# Patient Record
Sex: FEMALE | Race: WHITE | NOT HISPANIC OR LATINO | Employment: FULL TIME | URBAN - METROPOLITAN AREA
[De-identification: names, ages, dates, MRNs, and addresses within clinical notes are randomized per-mention and may not be internally consistent; named-entity substitution may affect disease eponyms.]

---

## 2020-07-30 ENCOUNTER — TELEPHONE (OUTPATIENT)
Dept: NEUROLOGY | Facility: HOSPITAL | Age: 25
End: 2020-07-30

## 2020-07-30 NOTE — TELEPHONE ENCOUNTER
----- Message from Michelle Medellin sent at 7/30/2020 11:50 AM CDT -----  Regarding: NEW  New patient - Who called: Referral faxed from Endocrinology [facility]    Referral source- MD/patient/Internet/other: [place]/Dr. 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 Chapman    Patient call back #: [phone]    PMH: Anxiety, Depression, Asthma, Hypoglycemia, GERD    Medication:    Allergies: Methylprednisolone, Amoxicillin, Penicillin    Treatments:  Octreotide 100 mcg TID     Records:  EGD & Colonoscopy report requested from 2020, and Recent Treating MD Clinic Note 7/21/2020

## 2020-07-30 NOTE — TELEPHONE ENCOUNTER
----- Message from Michelle Medellin sent at 7/30/2020 11:50 AM CDT -----  Regarding: NEW  New patient - Who called: Referral faxed from Endocrinology [facility]    Referral source- MD/patient/Internet/other: [place]/Dr. 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 Chapman    Patient call back #: [phone]    PMH: Anxiety, Depression, Asthma, Hypoglycemia, GERD    Medication:    Allergies: Methylprednisolone, Amoxicillin, Penicillin    Treatments:  Octreotide 100 mcg TID     Records:  EGD & Colonoscopy report requested from 2020, and Recent Treating MD Clinic Note 7/21/2020

## 2020-07-31 ENCOUNTER — TELEPHONE (OUTPATIENT)
Dept: NEUROLOGY | Facility: HOSPITAL | Age: 25
End: 2020-07-31

## 2020-07-31 DIAGNOSIS — E16.1 ADULT ONSET PERSISTENT HYPERINSULINEMIC HYPOGLYCEMIA WITHOUT INSULINOMA: Primary | ICD-10-CM

## 2020-07-31 NOTE — TELEPHONE ENCOUNTER
----- Message from Michelle Medellin sent at 7/30/2020 11:50 AM CDT -----  Regarding: NEW  New patient - Who called: Referral faxed from Endocrinology [facility]    Referral source- MD/patient/Internet/other: [place]/Dr. 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 Chapman    Patient call back #: [phone]    PMH: Anxiety, Depression, Asthma, Hypoglycemia, GERD    Medication:    Allergies: Methylprednisolone, Amoxicillin, Penicillin    Treatments:  Octreotide 100 mcg TID     Records:  EGD & Colonoscopy report requested from 2020, and Recent Treating MD Clinic Note 7/21/2020

## 2020-08-03 ENCOUNTER — TELEPHONE (OUTPATIENT)
Dept: NEUROLOGY | Facility: HOSPITAL | Age: 25
End: 2020-08-03

## 2020-08-03 NOTE — TELEPHONE ENCOUNTER
----- Message from Michelle Medellin sent at 7/30/2020 11:50 AM CDT -----  Regarding: NEW  New patient - Who called: Referral faxed from Endocrinology [facility]    Referral source- MD/patient/Internet/other: [place]/Dr. 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 Chapman    Patient call back #: [phone]    PMH: Anxiety, Depression, Asthma, Hypoglycemia, GERD    Medication:    Allergies: Methylprednisolone, Amoxicillin, Penicillin    Treatments:  Octreotide 100 mcg TID     Records:  EGD & Colonoscopy report requested from 2020, and Recent Treating MD Clinic Note 7/21/2020

## 2020-08-06 ENCOUNTER — DOCUMENTATION ONLY (OUTPATIENT)
Dept: NEUROLOGY | Facility: HOSPITAL | Age: 25
End: 2020-08-06

## 2020-08-10 ENCOUNTER — HOSPITAL ENCOUNTER (OUTPATIENT)
Dept: RADIOLOGY | Facility: HOSPITAL | Age: 25
Discharge: HOME | End: 2020-08-10
Attending: SURGERY
Payer: COMMERCIAL

## 2020-08-10 DIAGNOSIS — E16.1 ADULT ONSET PERSISTENT HYPERINSULINEMIC HYPOGLYCEMIA WITHOUT INSULINOMA: ICD-10-CM

## 2020-08-10 PROCEDURE — 78815 PET IMAGE W/CT SKULL-THIGH: CPT | Mod: 26,PI,, | Performed by: RADIOLOGY

## 2020-08-10 PROCEDURE — 78815 PET IMAGE W/CT SKULL-THIGH: CPT | Mod: TC

## 2020-08-10 PROCEDURE — 78815 NM PET 68GA DOTATATE WHOLE BODY: ICD-10-PCS | Mod: 26,PI,, | Performed by: RADIOLOGY

## 2020-08-10 PROCEDURE — A9587 GALLIUM GA-68: HCPCS

## 2020-11-27 ENCOUNTER — OFFICE VISIT (OUTPATIENT)
Dept: ENDOCRINOLOGY | Facility: CLINIC | Age: 25
End: 2020-11-27
Payer: COMMERCIAL

## 2020-11-27 VITALS
DIASTOLIC BLOOD PRESSURE: 68 MMHG | HEART RATE: 70 BPM | OXYGEN SATURATION: 98 % | WEIGHT: 185.63 LBS | RESPIRATION RATE: 16 BRPM | SYSTOLIC BLOOD PRESSURE: 116 MMHG

## 2020-11-27 DIAGNOSIS — E16.2 HYPOGLYCEMIA: Primary | ICD-10-CM

## 2020-11-27 DIAGNOSIS — F32.A DEPRESSION, UNSPECIFIED DEPRESSION TYPE: ICD-10-CM

## 2020-11-27 PROCEDURE — 99204 PR OFFICE/OUTPT VISIT, NEW, LEVL IV, 45-59 MIN: ICD-10-PCS | Mod: S$GLB,,, | Performed by: STUDENT IN AN ORGANIZED HEALTH CARE EDUCATION/TRAINING PROGRAM

## 2020-11-27 PROCEDURE — 99999 PR PBB SHADOW E&M-EST. PATIENT-LVL III: CPT | Mod: PBBFAC,,, | Performed by: STUDENT IN AN ORGANIZED HEALTH CARE EDUCATION/TRAINING PROGRAM

## 2020-11-27 PROCEDURE — 99204 OFFICE O/P NEW MOD 45 MIN: CPT | Mod: S$GLB,,, | Performed by: STUDENT IN AN ORGANIZED HEALTH CARE EDUCATION/TRAINING PROGRAM

## 2020-11-27 PROCEDURE — 99999 PR PBB SHADOW E&M-EST. PATIENT-LVL III: ICD-10-PCS | Mod: PBBFAC,,, | Performed by: STUDENT IN AN ORGANIZED HEALTH CARE EDUCATION/TRAINING PROGRAM

## 2020-11-27 RX ORDER — FLASH GLUCOSE SCANNING READER
1 EACH MISCELLANEOUS DAILY
Qty: 1 EACH | Refills: 0 | Status: SHIPPED | OUTPATIENT
Start: 2020-11-27 | End: 2020-12-16 | Stop reason: SDUPTHER

## 2020-11-27 RX ORDER — BLOOD-GLUCOSE SENSOR
EACH MISCELLANEOUS
Qty: 3 EACH | Refills: 11 | Status: CANCELLED | OUTPATIENT
Start: 2020-11-27

## 2020-11-27 RX ORDER — FLASH GLUCOSE SENSOR
2 KIT MISCELLANEOUS
Qty: 2 KIT | Refills: 11 | Status: SHIPPED | OUTPATIENT
Start: 2020-11-27 | End: 2020-11-30

## 2020-11-27 RX ORDER — PREDNISONE 10 MG/1
TABLET ORAL
COMMUNITY
Start: 2020-10-08

## 2020-11-27 RX ORDER — BUPROPION HYDROCHLORIDE 150 MG/1
TABLET, EXTENDED RELEASE ORAL
COMMUNITY

## 2020-11-27 RX ORDER — BUPROPION HYDROCHLORIDE 150 MG/1
TABLET ORAL
COMMUNITY

## 2020-11-27 RX ORDER — OMEPRAZOLE MAGNESIUM 10 MG/1
GRANULE, DELAYED RELEASE ORAL
COMMUNITY

## 2020-11-27 RX ORDER — BLOOD-GLUCOSE TRANSMITTER
EACH MISCELLANEOUS
Qty: 1 DEVICE | Refills: 4 | Status: CANCELLED | OUTPATIENT
Start: 2020-11-27

## 2020-11-27 RX ORDER — FLASH GLUCOSE SENSOR
KIT MISCELLANEOUS
COMMUNITY
End: 2020-11-30

## 2020-11-27 NOTE — PATIENT INSTRUCTIONS
"Please perform fingerstick with glucometer if your freestyle mic reads <55 and record to be evaluated at next visit    If both sources showing glucose <55, please obtain lab work at that time and have it drawn immediately to acquire serum blood glucose and necessary labs at preferred laboratory    Please obtain labs prior to next virtual visit.          Hypoglycemia Treatment - The "15-15 Rule".    If you experience an episode of hypoglycemia (glucose less than 70), follow the 15-15 rule.    Have 15 grams of carbohydrate to raise your blood sugar and recheck it after 15 minutes. If its still below 70 mg/dL, have another serving.    Repeat these steps until your blood sugar is at least 70 mg/dL. Once your blood sugar is back to normal, eat a meal or snack to make sure it doesnt lower again.    This may be:    4 ounces (1/2 cup) of juice or regular soda (not diet)  1 tablespoon of sugar, honey, or corn syrup  Hard candies, jellybeans, or gumdrops--see food label for how many to consume  Make a note about any episodes of low blood sugar and talk with your health care team about why it happened. They can suggest ways to avoid low blood sugar in the future.     Many people tend to want to eat as much as they can until they feel better. This can cause blood sugar levels to shoot way up. Using the step-wise approach of the "15-15 Rule" can help you avoid this, preventing high blood sugar levels.    Note:  When treating a low, the choice of carbohydrate source is important. Complex carbohydrates, or foods that contain fats along with carbs (like chocolate) can slow the absorption of glucose and should not be used to treat an emergency low.    For more information, visit:  https://www.diabetes.org/diabetes/medication-management/blood-glucose-testing-and-control/hypoglycemia        "

## 2020-11-27 NOTE — PROGRESS NOTES
Subjective:      Patient ID: Gemma Granados is a 25 y.o. female.    Chief Complaint:  Hypoglycemia      History of Present Illness  Ms. Granados is a 26 yo female past medical history for anxiety, depression, asthma, recurrent hypoglycemia, GERD presents for initial evaluation for recurrent hypoglycemia and neuroendocrine tumor evaluation.    Initial Endocrine evaluation:  11/20/2019    Patient recently evaluated by Medical Oncology for suspected neuroendocrine tumor.    She was experiencing episodes of hypoglycemia immediately after meals for several years beginning 2018 however recalls dating back to childhood.    The episodes were apparently sporadic without any association with menstrual cycle.  She denies consistent recurrence or identifiable association with diet.    She endorses at these hypoglycemic episodes occur duration 1-2 weeks.   Variable recurrence occur every 2-3 weeks and has been without an episode for 1 month max.    Symptoms associated with diaphoresis, shaking, tremors, nausea, headache with FS glucose in 70s with glucose levels dropping to 55 postprandially  Symptoms most often precipitated postprandially  Denies any bowel or weight change, numbness tingling, low blood pressure, or chronic/recent steroid use    Patient has been seen by multiple providers with extensive workups.    She is noted to have positive serum Chromogranin A, and negative initial lab screening for pheochromocytoma, serum5-HIAA, serotonin, vasoactive intestinal peptide.    Workup thus far:      7/13/20     Chromogranin A (105)    Serotonin (58)    Na (138), K (4), Glu (89)    VIP (unavailable)       03/04/20    5-HIAA (9.1)    Chromogranin A (142)    Fasting Insulin (7.2)    C-peptide (1.1)    Pancreatic polypeptide (<12.6)    Gastrin (47)    Glucagon (unavailable)    TSH (2.89)    FT4 (1.05)      IGF-1 (11/21/19) (235)   S. Norepi (729)   S. Epi (26)   Dopa (<30)   U. Norepi (124)   U. Metaneph (34)   DHEAS  (395)   Cortisol 4/9/20 07:29 (8.6)      Fasting labs (11/2019): glucose (57), insulin (7.5), proinsulin (6), C-peptide (2.2), A1C (4.8), BHB (1)   NO available sulfonurea screen or insulin ab     Radiographic evaluation:   CT abdomen/Pelvis pancreatic protocol (03/17/20) - right adnexal septated mass 4.3 x 3.1 cm presumed ovarian cyst   EGD (2/6/20) - nondiagnostic   Colonoscopy (4/30/20) with elevated Chromogranin A - nondiagnostic   Octreoscan (4/17/20) - nondiagnostic - not taking octreotide at the time   Pet Gallium 68 net spot (8/10/20) - nondiagnostic for somatostatin receptor disease    CGM with FS mic: 5/3 - 5/18/2020 = Random hypoglycemia in 40s fasting and during day     Current medications:    Freestyle mic (check 6-8 times per day)    Prednisone 5 mg daily PRN for recurrent hives. Takes infrequent takes less than twice per year - improves her blood glucose when taking significantly, no reported lows.    Medications attempted:   Acarbose 25 mg TID - no improvement   Diazoxide BID - not tolerated with GI s/e   Octreotide 100 mcg soln t.i.d. 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  Denies tobacco, alcohol, drug use    Regarding Recurrent hypoglycemia,   Symptoms first started since childhood became worse in late 2018 began occurring more frequently 1-2 weeks duration BG will be low despite diet modification. Previously hypoglycemia aware, however no longer able to feel lows. BG will slowly return to normal after 20-30 min. Will respond to juice  Does not have symptoms unless very low <40.   Avoids alcohol as it worsens  Certain food especially carbohydrates occur.  Checks glucose levels at least 5-6 times per day due to concerns of hypoglycemia unawareness and overnight  lows.    When the episodes do not occur, she believes diet does not affect BG.    Episode lasted: 30 minutes  Episode occur  Nearly every time she eats (1-2 week duration)  Episode within 30 minutes to 1 hour of consuming meal  Treatment included:  Usually waits and returns to normal unless declines, then will try juice  Glucose checked <55 during episode (average 40s) - lowest 27 (w/o syncope/LOC)    Episodes occur overnight: infrequent (stopped eating prior to bedtime usually worse and associated with bedtime snack)  Episodes occur after prolonged fasting: unchanged  Episodes occur with physical exertion: OFTEN  Consuming a carb containing snack usually relieves his symptoms slowly but does improve (1 hour)    Context of events:  Fasting or postprandial  Meals that precipitate symptoms: unable to identify any specific food groups    Symptoms during episode:     No   Yes  []    [x]   Shakiness  []    [x]   Confusion  - when very low  []    [x]   Weakness  []    [x]   Diaphoresis  [x]    []   Visual change  [x]    []   Tunnel vision  []    [x]   Mental Fog  [x]    []   Syncope  [x]    []   Weight loss  []    [x]   Palpitations  []    [x]   Tremor  []    [x]   Anxiety/Nervousness - panic attack  [x]    []   Polyuria  [x]    []   Polydipsia  [x]    []   Nocturia  [x]    []   New Medications    [x]    []   Alcohol use  [x]    []   History of Diabetes  [x]    []   Recent use of insulin or insulin secretagogues  [x]    []   History of GI surgery, bypass  [x]    []   History of malabsorption, celiacs disease  [x]    []   History of steroid use  [x]    []   History of adrenal insufficiency  [x]    []   History of thyroid disorder  [x]    []   No personal or family history of MEN-1    Experiences  Sympathetic Symptoms  - Sweating, Palpitation, Tremor, Anxiety  Neuroglycopenia  - cognitive impairment/confusion, behavior changes, weakness, paresthesias, poor motor coordination        Review of Systems   Constitutional:  Negative for activity change, appetite change, chills, diaphoresis, fatigue, fever and unexpected weight change.   HENT: Negative for sore throat, trouble swallowing and voice change.    Eyes: Negative for visual disturbance.   Respiratory: Negative for shortness of breath.    Cardiovascular: Negative for chest pain, palpitations and leg swelling.   Gastrointestinal: Negative for abdominal pain, constipation, diarrhea, nausea and vomiting.   Endocrine: Negative for cold intolerance, heat intolerance, polydipsia, polyphagia and polyuria.   Genitourinary: Negative for urgency.   Musculoskeletal: Negative for arthralgias.   Skin: Negative for wound.   Neurological: Negative for tremors, weakness and headaches.   Psychiatric/Behavioral: Negative for confusion and sleep disturbance. The patient is not nervous/anxious.        Social and family history reviewed  Current medications and allergies reviewed    Objective:   /68 (BP Location: Right arm, Patient Position: Sitting)   Pulse 70   Resp 16   Wt 84.2 kg (185 lb 10 oz)   SpO2 98%   Physical Exam  Vitals signs and nursing note reviewed.   Constitutional:       General: She is not in acute distress.     Appearance: She is well-developed.   HENT:      Head: Normocephalic and atraumatic.      Right Ear: External ear normal.      Left Ear: External ear normal.      Nose: Nose normal.   Eyes:      General: No scleral icterus.     Conjunctiva/sclera: Conjunctivae normal.   Neck:      Musculoskeletal: Normal range of motion and neck supple. No neck rigidity.      Thyroid: No thyromegaly.      Trachea: No tracheal deviation.      Comments: No thyroid enlargement noted on exam  Cardiovascular:      Rate and Rhythm: Normal rate.      Heart sounds: Normal heart sounds. No murmur.   Pulmonary:      Effort: Pulmonary effort is normal.      Breath sounds: Normal breath sounds.   Abdominal:      Palpations: Abdomen is soft. There is no mass.      Tenderness: There is no  abdominal tenderness.   Musculoskeletal: Normal range of motion.   Lymphadenopathy:      Cervical: No cervical adenopathy.   Skin:     General: Skin is warm.      Findings: No rash.   Neurological:      Mental Status: She is alert and oriented to person, place, and time.      Sensory: No sensory deficit.      Coordination: Coordination normal.   Psychiatric:         Judgment: Judgment normal.       BP Readings from Last 1 Encounters:   11/27/20 116/68      Wt Readings from Last 1 Encounters:   11/27/20 1112 84.2 kg (185 lb 10 oz)     There is no height or weight on file to calculate BMI.    Lab Review:   No results found for: HGBA1C  No results found for: CHOL, HDL, LDLCALC, TRIG, CHOLHDL  No results found for: NA, K, CL, CO2, GLU, BUN, CREATININE, CALCIUM, PROT, ALBUMIN, BILITOT, ALKPHOS, AST, ALT, ANIONGAP, ESTGFRAFRICA, EGFRNONAA, TSH    All pertinent labs reviewed    Assessment and Plan     Hypoglycemia  Unclear etiology - history suggestive of postprandial hypoglycemia    -  Has been seen by multiple providers with extensive workups.      -  From a laboratory standpoint she has a positive serum chromogranin a, but negative laboratory evaluation for pheochromocytoma, serum serotonin, adrenal function, normal IGF-1, thyroid function    -  Additionally, radiographic/procedural evaluation including a EGD/colonoscopy, endoscopic US (Dr. Alex Still in [place]), CT abdomen/pelvis, Octreoscan, and most recently NM PET gallium 68 dotatate have all been nondiagnostic.    -  Worse fluctuations with octreotide  -  No improvement with Acarbose  -  Worse s/e with diazoxide  Available lab workup although not optimal is suggestive of endogenous insulin production vs sulfonyl urea abuse vs insulin ab induced.   (per care everywhere):   Fasting labs (11/2019):    Glucose (57)   Insulin (7.5)   Proinsulin (6)   C-peptide (2.2)   A1C (4.8)   BHB (1)   No available sulfonurea screen or insulin ab    -  Suspect serum  "chromogranin A was false positive.  -  Current imaging available outlined above non-diagnostic for neuroendocrine tumor   -  Family history and without known MEN or neuroendocrine tumor  -  Denies sulfonurea or insulin use  -  Patient lives 2-3 hours away and prefers virtual visits.  -  Hypoglycemia unaware, pt concerned of o/n lows    PLAN  Instructed patient to obtain FS with glucometer recordings and freestyle mic to bring documentation of lows at next virtual visit in 1-2 months    Rx for FS mic 2 today provided, although pt prefers dexcom    Instructed to obtain labs (external labs) while at work within 30 minutes of low blood glucose noted <55 on at least two separate sources of recorded low blood glucose as we are unable to perform observed fasting or mixed meal testing at our facility at this time     Sulfonurea screen   Insulin   Proinsulin    CMP   BHB   C-peptide   Insulin antibody    -  Advised patient if glucose is low, she should repeat fingerstick in different area of her body  -  Continue protein heavy meal  -  Avoid simple carbohydrates, processed meats  -  Provided patient with Hypoglycemia Treatment - The "15-15 Rule"  -  Consider uncooked cornstarch 1.5-2.5g/kg Q4-6H (or 14 grams/2TbL Q4-6H) vs (0.3-0.55g/kg body weight)    -  Consider Professional CGM with dexcom  -  Can consider selective arterial calcium stimulation test with hepatic venous sampling to distinguish between a focal abnormality (insulinoma) and a diffuse process (islet-cell hypertrophy/nesidioblastosis)      Depression  On wellbutrin, Sertraline - managed by PCP      Follow up virtual visit in approximately  1 month once labs acquired     Joselito Ibanez DO  Endocrinology Fellow  [facility], [address]th Floor  [address] [place], [place], [address]    Office: [phone]  Fax: [phone]       Disclaimer: This note has been generated using voice-recognition software. There may be typographical " errors that have been missed during proof-reading.    The above history labs imaging impression and plan were discussed with attending physician who is in agreement and also took part in this patient's care.  I personally reviewed all of the patients available medications, labs, imaging, vitals, allergies, medical history.

## 2020-11-27 NOTE — PROGRESS NOTES
I have reviewed and concur with Dr. Ibanez's history, physical, assessment, and plan.  I have personally interviewed and examined the patient.  See below addendum for my evaluation and additional findings.    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  At the time of diagnosis she had a plasma blood glucose of 57 with elevated insulin, proinsulin, and C-peptide however no sulfonylurea or hypoglycemic agent screen was done at that time (sulfonylurea screen done several months later was negative however she was not hypoglycemic at that time).  She has had an extensive workup including multiple imaging studies that were normal.  She has been treated with short-acting octreotide which worsened her symptoms and acarbose which did not help.  As she is currently not on any medications but still reporting clustered episodes of hypoglycemia every couple of weeks, will have her repeat hypoglycemia labs.  We will switch to the freestyle mic 2 continuous glucose monitor as it has the benefit of alarms for low blood sugars.  She will have labs drawn when having hypoglycemia confirmed with fingerstick blood glucose less than 55.  Will follow up with a virtual visit in 1 month to discuss results and next steps.  If she continues to have hypoglycemia with elevated C-peptide and insulin we may need to proceed with selective arterial calcium stimulation test.    Jaspal Fraser MD

## 2020-11-27 NOTE — ASSESSMENT & PLAN NOTE
Unclear etiology - history suggestive of postprandial hypoglycemia    -  Has been seen by multiple providers with extensive workups.      -  From a laboratory standpoint she has a positive serum chromogranin a, but negative laboratory evaluation for pheochromocytoma, serum serotonin, adrenal function, normal IGF-1, thyroid function    -  Additionally, radiographic/procedural evaluation including a EGD/colonoscopy, endoscopic US (Dr. Alex Still in [place]), CT abdomen/pelvis, Octreoscan, and most recently NM PET gallium 68 dotatate have all been nondiagnostic.    -  Worse fluctuations with octreotide  -  No improvement with Acarbose  -  Worse s/e with diazoxide  Available lab workup although not optimal is suggestive of endogenous insulin production vs sulfonyl urea abuse vs insulin ab induced.   (per care everywhere):   Fasting labs (11/2019):    Glucose (57)   Insulin (7.5)   Proinsulin (6)   C-peptide (2.2)   A1C (4.8)   BHB (1)   No available sulfonurea screen or insulin ab    -  Suspect serum chromogranin A was false positive.  -  Current imaging available outlined above non-diagnostic for neuroendocrine tumor   -  Family history and without known MEN or neuroendocrine tumor  -  Denies sulfonurea or insulin use  -  Patient lives 2-3 hours away and prefers virtual visits.  -  Hypoglycemia unaware, pt concerned of o/n lows    PLAN  Instructed patient to obtain FS with glucometer recordings and freestyle mic to bring documentation of lows at next virtual visit in 1-2 months    Rx for FS mic 2 today provided, although pt prefers dexcom    Instructed to obtain labs (external labs) while at work within 30 minutes of low blood glucose noted <55 on at least two separate sources of recorded low blood glucose as we are unable to perform observed fasting or mixed meal testing at our facility at this time     Sulfonurea screen   Insulin   Proinsulin    CMP   BHB   C-peptide   Insulin antibody    -  Advised patient  "if glucose is low, she should repeat fingerstick in different area of her body  -  Continue protein heavy meal  -  Avoid simple carbohydrates, processed meats  -  Provided patient with Hypoglycemia Treatment - The "15-15 Rule"  -  Consider uncooked cornstarch 1.5-2.5g/kg Q4-6H (or 14 grams/2TbL Q4-6H) vs (0.3-0.55g/kg body weight)    -  Consider Professional CGM with dexcom  -  Can consider selective arterial calcium stimulation test with hepatic venous sampling to distinguish between a focal abnormality (insulinoma) and a diffuse process (islet-cell hypertrophy/nesidioblastosis)    "

## 2020-11-30 ENCOUNTER — PATIENT MESSAGE (OUTPATIENT)
Dept: ENDOCRINOLOGY | Facility: CLINIC | Age: 25
End: 2020-11-30

## 2020-11-30 RX ORDER — FLASH GLUCOSE SENSOR
2 KIT MISCELLANEOUS
Qty: 1 KIT | Refills: 11 | Status: SHIPPED | OUTPATIENT
Start: 2020-11-30 | End: 2020-12-16 | Stop reason: SDUPTHER

## 2020-12-15 DIAGNOSIS — E16.2 HYPOGLYCEMIA: ICD-10-CM

## 2020-12-16 DIAGNOSIS — E16.2 HYPOGLYCEMIA: ICD-10-CM

## 2020-12-16 RX ORDER — FLASH GLUCOSE SCANNING READER
1 EACH MISCELLANEOUS DAILY
Qty: 1 EACH | Refills: 0 | Status: SHIPPED | OUTPATIENT
Start: 2020-12-16

## 2020-12-16 RX ORDER — FLASH GLUCOSE SENSOR
2 KIT MISCELLANEOUS
Qty: 1 KIT | Refills: 11 | Status: SHIPPED | OUTPATIENT
Start: 2020-12-16

## 2020-12-16 RX ORDER — FLASH GLUCOSE SENSOR
2 KIT MISCELLANEOUS
Qty: 2 KIT | Refills: 11 | Status: SHIPPED | OUTPATIENT
Start: 2020-12-16

## 2021-01-08 ENCOUNTER — OFFICE VISIT (OUTPATIENT)
Dept: ENDOCRINOLOGY | Facility: CLINIC | Age: 26
End: 2021-01-08
Payer: COMMERCIAL

## 2021-01-08 DIAGNOSIS — E16.2 HYPOGLYCEMIA: ICD-10-CM

## 2021-01-08 DIAGNOSIS — F32.A DEPRESSION, UNSPECIFIED DEPRESSION TYPE: ICD-10-CM

## 2021-01-08 DIAGNOSIS — E66.9 OBESITY, UNSPECIFIED CLASSIFICATION, UNSPECIFIED OBESITY TYPE, UNSPECIFIED WHETHER SERIOUS COMORBIDITY PRESENT: ICD-10-CM

## 2021-01-08 PROCEDURE — 99214 OFFICE O/P EST MOD 30 MIN: CPT | Mod: 95,,, | Performed by: STUDENT IN AN ORGANIZED HEALTH CARE EDUCATION/TRAINING PROGRAM

## 2021-01-08 PROCEDURE — 99214 PR OFFICE/OUTPT VISIT, EST, LEVL IV, 30-39 MIN: ICD-10-PCS | Mod: 95,,, | Performed by: STUDENT IN AN ORGANIZED HEALTH CARE EDUCATION/TRAINING PROGRAM

## 2021-01-08 RX ORDER — BLOOD-GLUCOSE SENSOR
EACH MISCELLANEOUS
Qty: 3 EACH | Refills: 11 | Status: SHIPPED | OUTPATIENT
Start: 2021-01-08 | End: 2021-02-18 | Stop reason: SDUPTHER

## 2021-01-08 RX ORDER — BLOOD-GLUCOSE TRANSMITTER
EACH MISCELLANEOUS
Qty: 1 DEVICE | Refills: 4 | Status: SHIPPED | OUTPATIENT
Start: 2021-01-08

## 2021-01-29 ENCOUNTER — TELEPHONE (OUTPATIENT)
Dept: ENDOCRINOLOGY | Facility: CLINIC | Age: 26
End: 2021-01-29

## 2021-02-16 ENCOUNTER — PATIENT MESSAGE (OUTPATIENT)
Dept: ENDOCRINOLOGY | Facility: CLINIC | Age: 26
End: 2021-02-16

## 2021-02-16 DIAGNOSIS — E16.2 HYPOGLYCEMIA: ICD-10-CM

## 2021-02-18 RX ORDER — BLOOD-GLUCOSE SENSOR
EACH MISCELLANEOUS
Qty: 10 EACH | Refills: 3 | Status: SHIPPED | OUTPATIENT
Start: 2021-02-18

## 2021-04-23 ENCOUNTER — OFFICE VISIT (OUTPATIENT)
Dept: ENDOCRINOLOGY | Facility: CLINIC | Age: 26
End: 2021-04-23
Payer: COMMERCIAL

## 2021-04-23 DIAGNOSIS — E16.2 HYPOGLYCEMIA: ICD-10-CM

## 2021-04-23 DIAGNOSIS — F32.A DEPRESSION, UNSPECIFIED DEPRESSION TYPE: ICD-10-CM

## 2021-04-23 PROCEDURE — 99214 OFFICE O/P EST MOD 30 MIN: CPT | Mod: 95,,, | Performed by: STUDENT IN AN ORGANIZED HEALTH CARE EDUCATION/TRAINING PROGRAM

## 2021-04-23 PROCEDURE — 99214 PR OFFICE/OUTPT VISIT, EST, LEVL IV, 30-39 MIN: ICD-10-PCS | Mod: 95,,, | Performed by: STUDENT IN AN ORGANIZED HEALTH CARE EDUCATION/TRAINING PROGRAM

## 2023-11-10 ENCOUNTER — TELEPHONE (OUTPATIENT)
Dept: OPHTHALMOLOGY | Facility: CLINIC | Age: 28
End: 2023-11-10
Payer: COMMERCIAL

## 2023-11-10 NOTE — TELEPHONE ENCOUNTER
----- Message from Felicia Hearn sent at 11/10/2023 10:35 AM CST -----  Regarding: Referral  Good morning,     I received a referral on behalf of the patient above from Maria R Pearson requesting the patient be seen in Neuro-ophthalmology for multiple diagnoses.  I have scanned the referral and notes into media mgr.  Please review and contact the patient to schedule or to advise.     Thank you,       Felicia Hearn  [facility]      
Left message stating I was calling to schedule appt from referral in Media.   
Eye

## 2023-11-16 ENCOUNTER — TELEPHONE (OUTPATIENT)
Dept: OPHTHALMOLOGY | Facility: CLINIC | Age: 28
End: 2023-11-16
Payer: COMMERCIAL

## 2023-11-16 NOTE — TELEPHONE ENCOUNTER
----- Message from Cameron Cervantes sent at 11/16/2023  2:13 PM CST -----  Regarding: Returning a Missed Call  Contact: Gemma Granados  Returning a Missed Call    Caller: Gmema Granados       [facility]       Caller can be reached @: [phone] (home)      Nature of the call: Patient returning call to [facility] to be scheduled by referral.

## 2024-06-28 ENCOUNTER — OFFICE VISIT (OUTPATIENT)
Dept: ENDOCRINOLOGY | Facility: CLINIC | Age: 29
End: 2024-06-28
Payer: COMMERCIAL

## 2024-06-28 VITALS
WEIGHT: 213.31 LBS | DIASTOLIC BLOOD PRESSURE: 83 MMHG | HEIGHT: 67 IN | SYSTOLIC BLOOD PRESSURE: 120 MMHG | BODY MASS INDEX: 33.48 KG/M2

## 2024-06-28 DIAGNOSIS — E16.2 HYPOGLYCEMIA: Primary | ICD-10-CM

## 2024-06-28 PROCEDURE — 99999 PR PBB SHADOW E&M-EST. PATIENT-LVL III: CPT | Mod: PBBFAC,,, | Performed by: STUDENT IN AN ORGANIZED HEALTH CARE EDUCATION/TRAINING PROGRAM

## 2024-06-28 NOTE — PATIENT INSTRUCTIONS
If you do have symptoms and your fingerstick sugar is < 55, have labs drawn. There is a standing order for this. Please let us know when that happens so we can get the results when they come back.    Food diary, symptom diary.

## 2024-06-28 NOTE — PROGRESS NOTES
ENDOCRINOLOGY FOLLOW UP VISIT: 06/28/2024    Subjective:      Patient ID: Gemma Granados is a 29 y.o. female.    Chief Complaint:  Hypoglycemia    History of Present Illness               Initial Endocrine evaluation:  11/20/2019  Last seen in clinic by Dr. Fraser on 04/2021    Patient was seen by several endocrinologists, including the [facility] team in 2021, for severe hypoglycemia.  Currently she is managing her blood sugars with a Dexcom, says that when she sees her blood sugars down trending to correct by eating a snack.  Last time she had a very severe hypoglycemia episode was about 18 months ago, when her fingerstick blood sugar was in the 30s and she was severely symptomatic.    Usually episodes happen when she eats, mostly carb rich foods. Usually happens 40 min - 1 hr after eating.     She is noted to have positive serum Chromogranin A, and negative initial lab screening for pheochromocytoma, serum5-HIAA, serotonin, vasoactive intestinal peptide,  including multiple imaging studies that were normal   Fasting labs (11/2019): glucose (57), insulin (7.5), proinsulin (6), C-peptide (2.2), A1C (4.8), BHB (1)                    Radiographic evaluation:              CT abdomen/Pelvis pancreatic protocol (03/17/20) - right adnexal septated mass 4.3 x 3.1 cm presumed ovarian cyst              EGD (2/6/20) - nondiagnostic              Colonoscopy (4/30/20) with elevated Chromogranin A - nondiagnostic              Octreoscan (4/17/20) - nondiagnostic - not taking octreotide at the time             EUS (6/11/20) - endoscopic ultrasound by Dr. Johnson at [facility] in [place] that also was reportedly completely normal              Pet Gallium 68 net spot (8/10/20) - nondiagnostic for somatostatin receptor disease        CGM with Dexcom G6:      Current medications:  None         Medications attempted:              Acarbose 25 mg TID - no improvement              Diazoxide BID - not tolerated with GI s/e               "Octreotide 100 mcg soln t.i.d. (stopped - September caused higher glucose and more severe lows)        Family history:     Mother - recurrent hypoglycemia no formal diagnosis, GM - recurrent hypoglycemia no formal diagnosis      Father - none    No DM    ROS:   As above    Objective:     /83   Ht 5' 7" (1.702 m)   Wt 96.7 kg (213 lb 4.7 oz)   BMI 33.41 kg/m²   BP Readings from Last 3 Encounters:   06/28/24 120/83   11/27/20 116/68     Wt Readings from Last 1 Encounters:   06/28/24 0930 96.7 kg (213 lb 4.7 oz)     Body mass index is 33.41 kg/m².      Physical Exam  Vitals reviewed.   Constitutional:       General: She is not in acute distress.     Appearance: Normal appearance. She is not toxic-appearing.   Eyes:      Extraocular Movements: Extraocular movements intact.      Conjunctiva/sclera: Conjunctivae normal.      Pupils: Pupils are equal, round, and reactive to light.   Neck:      Thyroid: No thyroid mass, thyromegaly or thyroid tenderness.   Pulmonary:      Effort: Pulmonary effort is normal. No respiratory distress.   Musculoskeletal:      Cervical back: Neck supple.   Lymphadenopathy:      Cervical: No cervical adenopathy.   Skin:     General: Skin is warm and dry.   Neurological:      General: No focal deficit present.      Mental Status: She is oriented to person, place, and time.   Psychiatric:         Mood and Affect: Mood normal.         Thought Content: Thought content normal.            Assessment and Plan     Hypoglycemia  She has recurrent hypoglycemia worse after she eats, now has dexcom: review shows no episodes of hypoglycemia visible within the last 14 days. Has been seen by multiple providers with extensive workups.                   Prior Workup  -  Positive serum chromogranin a, but negative laboratory evaluation for pheochromocytoma, serum serotonin, adrenal function, normal IGF-1, thyroid function. Suspect serum chromogranin A was false positive.  -  Radiographic/procedural " evaluation including a EGD/colonoscopy, endoscopic US (Dr. Alex Still in [place]), CT abdomen/pelvis, Octreoscan, and most recently NM PET gallium 68 dotatate have all been nondiagnostic.  -  Patient lives 2-3 hours away and does not want to come here for further testing.  -  Discussed with patient regarding options for mixed meal testing, surveillance, confirmation with fingersticks, diet modification, meal diary.       PLAN  -  Continue routine daily surveillance with Dexcom and food/symptom diary   -  Instructed to obtain labs (external labs) while at work within 30 minutes of low blood glucose noted <55 on at least two separate sources of recorded low blood glucose (Insulin, Proinsulin, BHB,  C-peptide, serum glucose), orders placed     RTC PRN    Rosa Palomino MD  [facility], [address]th Floor  [address] [place], [place], [address]    Office: [phone]  Fax: [phone]

## 2024-06-28 NOTE — ASSESSMENT & PLAN NOTE
She has recurrent hypoglycemia worse after she eats, now has dexcom: review shows no episodes of hypoglycemia visible within the last 14 days. Has been seen by multiple providers with extensive workups.                   Prior Workup  -  Positive serum chromogranin a, but negative laboratory evaluation for pheochromocytoma, serum serotonin, adrenal function, normal IGF-1, thyroid function. Suspect serum chromogranin A was false positive.  -  Radiographic/procedural evaluation including a EGD/colonoscopy, endoscopic US (Dr. Alex Still in [place]), CT abdomen/pelvis, Octreoscan, and most recently NM PET gallium 68 dotatate have all been nondiagnostic.  -  Patient lives 2-3 hours away and does not want to come here for further testing.  -  Discussed with patient regarding options for mixed meal testing, surveillance, confirmation with fingersticks, diet modification, meal diary.       PLAN  -  Continue routine daily surveillance with Dexcom and food/symptom diary   -  Instructed to obtain labs (external labs) while at work within 30 minutes of low blood glucose noted <55 on at least two separate sources of recorded low blood glucose (Insulin, Proinsulin, BHB,  C-peptide, serum glucose), orders placed